# Patient Record
Sex: MALE
[De-identification: names, ages, dates, MRNs, and addresses within clinical notes are randomized per-mention and may not be internally consistent; named-entity substitution may affect disease eponyms.]

---

## 2023-11-19 ENCOUNTER — NON-APPOINTMENT (OUTPATIENT)
Age: 43
End: 2023-11-19

## 2024-01-01 ENCOUNTER — NON-APPOINTMENT (OUTPATIENT)
Age: 44
End: 2024-01-01

## 2024-02-20 PROBLEM — Z00.00 ENCOUNTER FOR PREVENTIVE HEALTH EXAMINATION: Status: ACTIVE | Noted: 2024-02-20

## 2024-02-21 ENCOUNTER — APPOINTMENT (OUTPATIENT)
Dept: ORTHOPEDIC SURGERY | Facility: CLINIC | Age: 44
End: 2024-02-21
Payer: COMMERCIAL

## 2024-02-21 DIAGNOSIS — M20.011 MALLET FINGER OF RIGHT FINGER(S): ICD-10-CM

## 2024-02-21 PROCEDURE — 99203 OFFICE O/P NEW LOW 30 MIN: CPT | Mod: 25

## 2024-02-21 PROCEDURE — 73140 X-RAY EXAM OF FINGER(S): CPT | Mod: F7

## 2024-02-21 PROCEDURE — 29130 APPL FINGER SPLINT STATIC: CPT | Mod: F7

## 2024-05-09 ENCOUNTER — NON-APPOINTMENT (OUTPATIENT)
Age: 44
End: 2024-05-09

## 2024-05-09 ENCOUNTER — APPOINTMENT (OUTPATIENT)
Dept: FAMILY MEDICINE | Facility: CLINIC | Age: 44
End: 2024-05-09
Payer: COMMERCIAL

## 2024-05-09 VITALS
SYSTOLIC BLOOD PRESSURE: 141 MMHG | WEIGHT: 236 LBS | BODY MASS INDEX: 28.44 KG/M2 | RESPIRATION RATE: 16 BRPM | HEIGHT: 76.5 IN | DIASTOLIC BLOOD PRESSURE: 86 MMHG | OXYGEN SATURATION: 96 % | HEART RATE: 71 BPM | TEMPERATURE: 98.1 F

## 2024-05-09 VITALS — SYSTOLIC BLOOD PRESSURE: 120 MMHG | DIASTOLIC BLOOD PRESSURE: 74 MMHG

## 2024-05-09 DIAGNOSIS — R03.0 ELEVATED BLOOD-PRESSURE READING, W/OUT DIAGNOSIS OF HYPERTENSION: ICD-10-CM

## 2024-05-09 DIAGNOSIS — Z78.9 OTHER SPECIFIED HEALTH STATUS: ICD-10-CM

## 2024-05-09 DIAGNOSIS — K21.9 GASTRO-ESOPHAGEAL REFLUX DISEASE W/OUT ESOPHAGITIS: ICD-10-CM

## 2024-05-09 DIAGNOSIS — Z82.49 FAMILY HISTORY OF ISCHEMIC HEART DISEASE AND OTHER DISEASES OF THE CIRCULATORY SYSTEM: ICD-10-CM

## 2024-05-09 DIAGNOSIS — Z82.3 FAMILY HISTORY OF STROKE: ICD-10-CM

## 2024-05-09 DIAGNOSIS — G43.909 MIGRAINE, UNSPECIFIED, NOT INTRACTABLE, W/OUT STATUS MIGRAINOSUS: ICD-10-CM

## 2024-05-09 DIAGNOSIS — L29.9 PRURITUS, UNSPECIFIED: ICD-10-CM

## 2024-05-09 DIAGNOSIS — I51.7 CARDIOMEGALY: ICD-10-CM

## 2024-05-09 PROCEDURE — 99386 PREV VISIT NEW AGE 40-64: CPT

## 2024-05-09 NOTE — HISTORY OF PRESENT ILLNESS
[de-identified] : 43-year-old male patient presenting for an annual physical exam and establishing care. He reports overall feeling mostly okay but has some concerns to discuss. Recent knee surgery (meniscus tear repair) on April 5th at [facility]. Gained 25 pounds since last summer due to inactivity after surgery. Noticed elevated blood pressure readings, previously had "textbook" blood pressure. Family history of hypertension (father started medication around patient's age), cardiovascular issues (father has aortic aneurysm and atrial fibrillation), maternal great grandmother with stroke. Pre-op ECG reported to show potential left atrial enlargement. Experiences GERD symptoms intermittently. Had a melanoma removed from back in 2011. Persistent itch on foot for the past 1.5 months, possibly related to footwear. , rarely drinks alcohol due to migraines, no smoking or drug use. Previously  of a [employer] ([employer] that was sold to [employer]. Blood pressure 141/86. Manual /74.